# Patient Record
Sex: MALE | Race: ASIAN | NOT HISPANIC OR LATINO | URBAN - METROPOLITAN AREA
[De-identification: names, ages, dates, MRNs, and addresses within clinical notes are randomized per-mention and may not be internally consistent; named-entity substitution may affect disease eponyms.]

---

## 2021-05-08 ENCOUNTER — EMERGENCY (EMERGENCY)
Facility: HOSPITAL | Age: 21
LOS: 1 days | Discharge: HOME | End: 2021-05-08
Attending: EMERGENCY MEDICINE | Admitting: EMERGENCY MEDICINE
Payer: COMMERCIAL

## 2021-05-08 VITALS
RESPIRATION RATE: 16 BRPM | HEART RATE: 94 BPM | OXYGEN SATURATION: 100 % | TEMPERATURE: 98 F | SYSTOLIC BLOOD PRESSURE: 131 MMHG | DIASTOLIC BLOOD PRESSURE: 65 MMHG

## 2021-05-08 PROCEDURE — 99284 EMERGENCY DEPT VISIT MOD MDM: CPT

## 2021-05-08 NOTE — ED PROVIDER NOTE - PHYSICAL EXAMINATION
Gen: Alert and oriented. Lying comfortably in bed. Answering questions appropriately  HEENT: face atraumatic and non ttp. right central incisor on top cracked and loose. Unclear whether crack at bottom tooth. No malalignment of the jaw. extra occular movements intact, no nasal discharge, mucous membranes moist  CV: Regular rate and rhythm, +S1/S2, no murmurs/rubs/gallops,   Resp: Clear to ausculation bilaterally, no wheezes/rhonchi/rales  GI: Abdomen soft non-distended, non tender to palpation, no masses  MSK: No open wounds, no bruising, no LE edema, Homans sign negative bl  Neuro: A&Ox4, following commands, moving all four extremities spontaneously  Psych: appropriate mood

## 2021-05-08 NOTE — ED PROVIDER NOTE - NSFOLLOWUPINSTRUCTIONS_ED_ALL_ED_FT
You have a tooth fracture.    Please follow up as advised by the dentist with an endodontist. If you need a referral please see contact below.    You may use tylenol for pain. Please do not exceed 3250 mg in 24 hours. You may use ibuprofen for pain. Please do not exceed 3000 mg in 24 hours.     Only eat soft foods and do not chew using the affected tooth.     Return to the Emergency Department for worsening or persistent symptoms, and/or ANY NEW OR CONCERNING SYMPTOMS. If you have issues obtaining follow up, please call: [phone] to obtain a doctor or specialist who takes your insurance in your area.

## 2021-05-08 NOTE — PROGRESS NOTE ADULT - SUBJECTIVE AND OBJECTIVE BOX
CC: 19 y/o presents with CC of trauma to upper anterior    HPI: 19 yo M no pmh presenting for tooth injury. Was playing football when he collided with another player. Now with a crack in his upper right front tooth. Endorsing pain and sensitivity when talking and eating.Denies any other facial pain or trauma. Denies loss of consciousness, fever, vomiting, or changes in vision.    Med HX: No pertinent past medical history    Tooth fracture    DENTAL PAIN    90+    SysAdmin_VisitLink        RX: amoxicillin-clavulanate 500 mg-125 mg oral tablet: 1 tab(s) orally 2 times a day        EOE: No abnormalities detected  TMJ (WNL)  Lacerations (-)  Trismus (-)  LAD (-)  Swelling (-)  LOC (-)  Dysphagia (-)    IOE: Permanent dentition. Grossly intact. (+) horizontal fracture of #8. (+) grade II mobility of coronal segment of #8  Hard/Soft palate (WNL)  Tongue/Floor of Mouth (WNL)  Lacerations (-)  Buccal Mucosa (WNL)  Percussion (-)  Palpation (-)  Swelling (-)  Mobility (-)     Radiographs: PA    Assessment: Zapata class III horizontal fracture tooth #8    Treatment:   EOE, IOE, RADS. Discussed clinical and radiographic findings. Isolated area as best as possible with BB, gauze, and cotton rolls. Irrigated area with chlorhexidine. Etch, optibond, flowable composite placed. Light cure. POIG, including avoidance of hard/sticky foods. Recommended patient follow up with their outpatient dentist or endodontist as soon as possible. All questions answered.    Recommendations:   1. Soft diet. OTC pain meds prn. Paroex or warm salt water rinses.  2. F/U with outpatient dentist or [facility] [phone]  3. Comprehensive dental care with outpatient dentist.  4. If any difficulty breathing/swallowing or fever and swelling occur, return to ED.    Alex Bennett DDS, #[id number]

## 2021-05-08 NOTE — ED PROVIDER NOTE - OBJECTIVE STATEMENT
19 yo M no pmh presenting for tooth injury. Was playing football when he got hit in the mouth. Now with a crack in his right front tooth. Endorsing extreme pain and sensitivity when talking and eating. Also thinks he  may have cracked a bottom tooth. Denies any other facial pain or trauma. Denies LOC, n/v.

## 2021-05-08 NOTE — ED PROVIDER NOTE - ATTENDING CONTRIBUTION TO CARE
21 y/o healthy M with dental pain.  Pt reports tonight he was playing football with friends and got struck in the mouth with the ball.  Pt now c/o pain to upper front tooth.  No LOC or fall to ground.  No HA, neck pain or stiffness, vision changes, epistaxis, other reported injury.  Well appearing, sitting comfortably in stretcher, awake and alert, nontoxic.  VSS.  NCAT EOMI PERRL.  No midface laxity.  Nasal bridge intoact, no epistaxis, no septal hematoma.  Post oropharynx clear, uvula midline, tongue normal.  (+)ttp at tooth #9 with laxity, no other maxillary or mandibular deformity or tenderness.  No malocclusion, no stridor o voice changes.  No focal neuro deficits, all extremities intact, no gross deformities, and normal ROM x 4.  Will c/s dental for partial dental fracture/subluxation for stabilization, pain meds, reassess.

## 2021-05-08 NOTE — ED PROVIDER NOTE - NSDCPRINTRESULTS_ED_ALL_ED
Patient requests all Lab and Radiology Results on their Discharge Instructions
decreased ability to use legs for bridging/pushing

## 2021-05-08 NOTE — ED PROVIDER NOTE - PROGRESS NOTE DETAILS
Joseph Frankel PGY2: dental saw patient. stabilized tooth and signed off on patient for discharge. Will dc with dental follow up. Discussed plan and return precautions with patient who understands and agrees. All questions answered.

## 2021-05-08 NOTE — ED PROVIDER NOTE - PATIENT PORTAL LINK FT
You can access the FollowMyHealth Patient Portal offered by [facility] by registering at the following website: [email or web address]. By joining [facility]’s FollowMyHealth portal, you will also be able to view your health information using other applications (apps) compatible with our system.

## 2021-05-08 NOTE — ED PROVIDER NOTE - CLINICAL SUMMARY MEDICAL DECISION MAKING FREE TEXT BOX
Joseph Frankel PGY2: 19 yo with tooth fracture. VSS. Patient looks well and is non toxic appearing. PE as above. No concern for other traumatic facial injury. Will consult dental. Patient's tetanus is UTD. Will reassess.

## 2021-05-08 NOTE — ED PROVIDER NOTE - NS ED ROS FT
Gen: Denies fever, chills, generalized weakness  CV: Denies chest pain, palpitations  HEENT: Denies neck pain, headache, vision changes  Skin: Denies rash, erythema, color changes  Resp: Denies SOB, cough  GI: Denies constipation, nausea, vomiting, diarrhea  Msk: Denies back pain, LE swelling, extremity pain  : Denies dysuria, increased frequency  Neuro: Denies LOC, focal weakness, numbness, tingling  Psych: Denies hx of psych, SI, HI

## 2021-05-08 NOTE — ED PROVIDER NOTE - NSFOLLOWUPCLINICS_GEN_ALL_ED_FT
[facility]  Dental  [address] [place] [id number]  Phone: [phone]  Fax: